# Patient Record
Sex: MALE | Race: WHITE | NOT HISPANIC OR LATINO | Employment: OTHER | URBAN - NONMETROPOLITAN AREA
[De-identification: names, ages, dates, MRNs, and addresses within clinical notes are randomized per-mention and may not be internally consistent; named-entity substitution may affect disease eponyms.]

---

## 2021-04-08 DIAGNOSIS — Z23 ENCOUNTER FOR IMMUNIZATION: ICD-10-CM

## 2021-08-23 ENCOUNTER — HOSPITAL ENCOUNTER (OUTPATIENT)
Dept: ULTRASOUND IMAGING | Facility: HOSPITAL | Age: 71
Discharge: HOME | End: 2021-08-23
Payer: COMMERCIAL

## 2021-08-23 DIAGNOSIS — I77.811 ABDOMINAL AORTIC ECTASIA (HCC): ICD-10-CM

## 2021-08-23 PROCEDURE — 76775 US EXAM ABDO BACK WALL LIM: CPT

## 2021-09-03 ENCOUNTER — HOSPITAL ENCOUNTER (OUTPATIENT)
Dept: NON INVASIVE DIAGNOSTICS | Facility: HOSPITAL | Age: 71
Discharge: HOME | End: 2021-09-03
Payer: COMMERCIAL

## 2021-09-03 DIAGNOSIS — I71.4 ABDOMINAL AORTIC ANEURYSM WITHOUT RUPTURE (HCC): ICD-10-CM

## 2021-09-03 PROCEDURE — 93880 EXTRACRANIAL BILAT STUDY: CPT

## 2021-09-03 PROCEDURE — 93880 EXTRACRANIAL BILAT STUDY: CPT | Performed by: SURGERY

## 2023-01-06 ENCOUNTER — HOSPITAL ENCOUNTER (OUTPATIENT)
Dept: CT IMAGING | Facility: HOSPITAL | Age: 73
End: 2023-01-06

## 2023-01-06 DIAGNOSIS — I71.43 ANEURYSM OF INFRARENAL ABDOMINAL AORTA, UNSPECIFIED WHETHER RUPTURED: ICD-10-CM

## 2023-02-22 DIAGNOSIS — E11.9 TYPE 2 DIABETES MELLITUS WITHOUT COMPLICATIONS (HCC): ICD-10-CM

## 2023-02-22 DIAGNOSIS — I77.810 THORACIC AORTIC ECTASIA (HCC): ICD-10-CM

## 2023-02-22 DIAGNOSIS — I10 ESSENTIAL (PRIMARY) HYPERTENSION: ICD-10-CM

## 2023-02-22 DIAGNOSIS — E66.01 MORBID (SEVERE) OBESITY DUE TO EXCESS CALORIES (HCC): ICD-10-CM

## 2023-02-28 ENCOUNTER — TELEPHONE (OUTPATIENT)
Dept: UROLOGY | Facility: AMBULATORY SURGERY CENTER | Age: 73
End: 2023-02-28

## 2023-02-28 NOTE — TELEPHONE ENCOUNTER
Reason for call: Patient called back and I scheduled him for 3/10, after his u/s appointment on 3/6  Please review and see if this is an appropriate date/time or if the patient needs to be seen sooner  I didn't see anything sooner than that      Patient can be reached at [phone]

## 2023-02-28 NOTE — TELEPHONE ENCOUNTER
Please Triage -   New Patient- Sandip Yo    What is the reason for the patients appointment? Dr Brain Narayan office called to schedule patient for gross hematuria  Patient is going to have u/s kidney/bladder on 03/06/23  Please review and call patient directly to schedule appointment  Patient can be reached at [phone]       Imaging/Lab Results:      Do we accept the patient's insurance or is the patient Self-Pay? Provider & Plan: [employer]   Member ID#:       Has the patient had any previous urologist(s)?no        Have patient records been requested?in epic        Has the patient had any outside testing done?in epic       Does the patient have a personal history of cancer?no       Patient can be reached at :

## 2023-03-02 ENCOUNTER — OFFICE VISIT (OUTPATIENT)
Dept: UROLOGY | Facility: CLINIC | Age: 73
End: 2023-03-02

## 2023-03-02 VITALS
BODY MASS INDEX: 40.42 KG/M2 | HEIGHT: 72 IN | TEMPERATURE: 98.3 F | WEIGHT: 298.4 LBS | DIASTOLIC BLOOD PRESSURE: 78 MMHG | HEART RATE: 73 BPM | OXYGEN SATURATION: 100 % | SYSTOLIC BLOOD PRESSURE: 146 MMHG

## 2023-03-02 DIAGNOSIS — R31.0 GROSS HEMATURIA: Primary | ICD-10-CM

## 2023-03-02 LAB
SL AMB  POCT GLUCOSE, UA: NORMAL
SL AMB LEUKOCYTE ESTERASE,UA: NORMAL
SL AMB POCT BILIRUBIN,UA: NORMAL
SL AMB POCT BLOOD,UA: NORMAL
SL AMB POCT CLARITY,UA: CLEAR
SL AMB POCT COLOR,UA: YELLOW
SL AMB POCT KETONES,UA: NORMAL
SL AMB POCT NITRITE,UA: NORMAL
SL AMB POCT PH,UA: 6
SL AMB POCT SPECIFIC GRAVITY,UA: 1.01
SL AMB POCT URINE PROTEIN: NORMAL
SL AMB POCT UROBILINOGEN: 0.2

## 2023-03-02 RX ORDER — PANTOPRAZOLE SODIUM 40 MG/1
TABLET, DELAYED RELEASE ORAL
COMMUNITY
End: 2023-03-13

## 2023-03-02 RX ORDER — DULAGLUTIDE 3 MG/.5ML
INJECTION, SOLUTION SUBCUTANEOUS
COMMUNITY
Start: 2023-01-04

## 2023-03-02 RX ORDER — KETOCONAZOLE 20 MG/G
CREAM TOPICAL
COMMUNITY
End: 2023-03-13

## 2023-03-02 RX ORDER — ATORVASTATIN CALCIUM 40 MG/1
TABLET, FILM COATED ORAL
COMMUNITY
Start: 2023-02-04

## 2023-03-02 RX ORDER — ASPIRIN 81 MG/1
81 TABLET ORAL DAILY
COMMUNITY

## 2023-03-02 RX ORDER — CEPHALEXIN 500 MG/1
CAPSULE ORAL
COMMUNITY
End: 2023-03-13

## 2023-03-02 RX ORDER — INSULIN ADMIN. SUPPLIES
INSULIN PEN (EA) SUBCUTANEOUS
COMMUNITY

## 2023-03-02 RX ORDER — NIRMATRELVIR AND RITONAVIR 300-100 MG
KIT ORAL
COMMUNITY
Start: 2023-01-08 | End: 2023-03-13

## 2023-03-02 RX ORDER — METOPROLOL SUCCINATE 25 MG/1
TABLET, EXTENDED RELEASE ORAL
COMMUNITY
Start: 2023-02-23

## 2023-03-02 RX ORDER — GLIPIZIDE 10 MG/1
TABLET, FILM COATED, EXTENDED RELEASE ORAL
COMMUNITY
Start: 2023-02-21

## 2023-03-02 RX ORDER — HYDROCHLOROTHIAZIDE 25 MG/1
TABLET ORAL
COMMUNITY
Start: 2023-01-15

## 2023-03-02 RX ORDER — AMLODIPINE BESYLATE 5 MG/1
TABLET ORAL DAILY
COMMUNITY
Start: 2023-01-18

## 2023-03-02 RX ORDER — CANDESARTAN 32 MG/1
TABLET ORAL
COMMUNITY
Start: 2023-02-23

## 2023-03-02 RX ORDER — OXYCODONE HYDROCHLORIDE 5 MG/1
TABLET ORAL
COMMUNITY

## 2023-03-02 NOTE — PROGRESS NOTES
UROLOGY PROGRESS NOTE         NAME: Guevara Young  AGE: 67 y o  SEX: male  : 1950   MRN: [id number]    DATE: 3/2/2023  TIME: 2:49 PM    Assessment and Plan      Impression:   1  Gross hematuria  -     POCT urine dip auto non-scope    Patient had a recent noncontrast CAT scan did not reveal any significant  abnormalities  Work-up for gross hematuria discussed with patient  He is agreeable to the work-up     Plan: Cystoscopy in the near future he should follow through with a renal ultrasound  We will send urine for cytology today we will get copies of his PSA and other blood work that was done  Risk of malignancy discussed  Chief Complaint     Chief Complaint   Patient presents with   • Gross Hematuria     Passing blood clots in urine on 23  No clots today  Saw pcp on Monday, and was referred to urology  Scheduled for US on 23 at [facility]  History of Present Illness     HPI: Bal Terrazas is a 67y o  year old male who presents with history of gross hematuria  This was noted to be in the toilet  He passed a small clot  His wife pointed out the blood in the toilet  No gross hematuria since then  This was a recent occurrence  He has no significant voiding dysfunction he does wake up at night to void  He generally has a good flow  He occasionally has some urgency  He feels that he empties  He has been on a significant diet the past year and has lost 50 pounds  No  history  He does have a history of normal PSAs and from the past   No stone history  No more hematuria since the original episode  He continues to have microscopic blood  1+ blood on today's visit    Otherwise normal UA              The following portions of the patient's history were reviewed and updated as appropriate: allergies, current medications, past family history, past medical history, past social history, past surgical history and problem list   Past Medical History:   Diagnosis Date   • Diabetes (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   • Wound of left leg      Past Surgical History:   Procedure Laterality Date   • COLONOSCOPY  2022   • HIATAL HERNIA REPAIR N/A    • WISDOM TOOTH EXTRACTION N/A     2 upper wisdom teeth     shoulder  Review of Systems     Const: Denies chills, fever and weight loss  CV: Denies chest pain  Resp: Denies SOB  GI: Denies abdominal pain, nausea and vomiting  : Denies symptoms other than stated above  Musculo: Denies back pain  Objective   /78 (BP Location: Left arm, Patient Position: Sitting)   Pulse 73   Temp 98 3 °F (36 8 °C)   Ht 6' (1 829 m)   Wt 135 kg (298 lb 6 4 oz)   SpO2 100%   BMI 40 47 kg/m²     Physical Exam  Const: Appears healthy and well developed  No signs of acute distress present  Resp: Respirations are regular and unlabored  CV: Rate is regular  Rhythm is regular  Abdomen: Abdomen is soft, nontender, and nondistended  Kidneys are not palpable  : No CVA tenderness  No suprapubic tenderness  No hernias  Penis testicles epididymis scrotum foreskin all normal   Prostate 1+ benign  Psych: Patient's attitude is cooperative   Mood is normal  Affect is normal     Current Medications     Current Outpatient Medications:   •  amLODIPine (NORVASC) 5 mg tablet, , Disp: , Rfl:   •  aspirin (ECOTRIN LOW STRENGTH) 81 mg EC tablet, Take 81 mg by mouth daily, Disp: , Rfl:   •  atorvastatin (LIPITOR) 40 mg tablet, , Disp: , Rfl:   •  candesartan (ATACAND) 32 MG tablet, , Disp: , Rfl:   •  glipiZIDE (GLUCOTROL XL) 10 mg 24 hr tablet, , Disp: , Rfl:   •  metFORMIN (GLUCOPHAGE) 1000 MG tablet, , Disp: , Rfl:   •  metoprolol succinate (TOPROL-XL) 25 mg 24 hr tablet, , Disp: , Rfl:   •  Trulicity 3 LT/3 5TB injection, , Disp: , Rfl:   •  cephalexin (KEFLEX) 500 mg capsule, Take by mouth (Patient not taking: Reported on 3/2/2023), Disp: , Rfl:   •  Empagliflozin 25 MG TABS, Take by mouth (Patient not taking: Reported on 3/2/2023), Disp: , Rfl:   •  hydrochlorothiazide (HYDRODIURIL) 25 mg tablet, , Disp: , Rfl:   •  Insulin Pen Needle (NovoFine Autocover Pen Needle) 30G X 8 MM MISC, , Disp: , Rfl:   •  ketoconazole (NIZORAL) 2 % cream, , Disp: , Rfl:   •  oxyCODONE (ROXICODONE) 5 immediate release tablet, , Disp: , Rfl:   •  pantoprazole (PROTONIX) 40 mg tablet, Take by mouth (Patient not taking: Reported on 3/2/2023), Disp: , Rfl:   •  Paxlovid, 300/100, tablet therapy pack, take 2 NIRMATRELVIR tablets with 1 RITONAVIR tablet twice a day for 5 days (Patient not taking: Reported on 3/2/2023), Disp: , Rfl:         Yarely Benton MD

## 2023-03-06 ENCOUNTER — HOSPITAL ENCOUNTER (OUTPATIENT)
Dept: ULTRASOUND IMAGING | Facility: HOSPITAL | Age: 73
Discharge: HOME | End: 2023-03-06

## 2023-03-06 DIAGNOSIS — R31.9 HEMATURIA, UNSPECIFIED: ICD-10-CM

## 2023-03-10 ENCOUNTER — TELEPHONE (OUTPATIENT)
Dept: UROLOGY | Facility: CLINIC | Age: 73
End: 2023-03-10

## 2023-03-10 ENCOUNTER — PREP FOR PROCEDURE (OUTPATIENT)
Dept: UROLOGY | Facility: CLINIC | Age: 73
End: 2023-03-10

## 2023-03-10 ENCOUNTER — PROCEDURE VISIT (OUTPATIENT)
Dept: UROLOGY | Facility: CLINIC | Age: 73
End: 2023-03-10

## 2023-03-10 VITALS
BODY MASS INDEX: 39.77 KG/M2 | TEMPERATURE: 97.1 F | SYSTOLIC BLOOD PRESSURE: 138 MMHG | HEIGHT: 72 IN | HEART RATE: 86 BPM | OXYGEN SATURATION: 92 % | DIASTOLIC BLOOD PRESSURE: 70 MMHG | WEIGHT: 293.6 LBS

## 2023-03-10 DIAGNOSIS — Z01.810 PRE-OPERATIVE CARDIOVASCULAR EXAMINATION: ICD-10-CM

## 2023-03-10 DIAGNOSIS — D49.4 BLADDER TUMOR: Primary | ICD-10-CM

## 2023-03-10 DIAGNOSIS — R39.89 SUSPECTED UTI: Primary | ICD-10-CM

## 2023-03-10 LAB
SL AMB  POCT GLUCOSE, UA: ABNORMAL
SL AMB LEUKOCYTE ESTERASE,UA: ABNORMAL
SL AMB POCT BILIRUBIN,UA: ABNORMAL
SL AMB POCT BLOOD,UA: ABNORMAL
SL AMB POCT CLARITY,UA: ABNORMAL
SL AMB POCT COLOR,UA: ABNORMAL
SL AMB POCT KETONES,UA: ABNORMAL
SL AMB POCT NITRITE,UA: ABNORMAL
SL AMB POCT PH,UA: 5
SL AMB POCT SPECIFIC GRAVITY,UA: 1.02
SL AMB POCT URINE PROTEIN: ABNORMAL
SL AMB POCT UROBILINOGEN: 1

## 2023-03-10 RX ORDER — CEFUROXIME AXETIL 500 MG/1
500 TABLET ORAL EVERY 12 HOURS SCHEDULED
Qty: 14 TABLET | Refills: 0 | Status: SHIPPED | OUTPATIENT
Start: 2023-03-10 | End: 2023-03-17

## 2023-03-10 NOTE — H&P (VIEW-ONLY)
UROLOGY PROGRESS NOTE         NAME: Guevara Young  AGE: 67 y o  SEX: male  : 1950   MRN: [id number]    DATE: 3/10/2023  TIME: 9:59 AM    Assessment and Plan      Impression:   1  Bladder tumor  -     cefuroxime (CEFTIN) 500 mg tablet; Take 1 tablet (500 mg total) by mouth every 12 (twelve) hours for 14 doses    Plan on TURBT next week  Informed consent obtained  Bilateral retrograde pyelograms     Plan: Patient has a small tumor in the right side of the bladder  Clot attached  We will plan on TURBT next week with bilateral retrograde pyelograms  Risk of malignancy discussed with patient  Chief Complaint     Chief Complaint   Patient presents with   • Cystoscopy   • Hematuria     History of Present Illness     HPI: Edyta Delvalle is a 67y o  year old male who presents with history hematuria for cystoscopy  CT scan and renal ultrasound reveal cystic structures consistent with benign cyst   Urine cytology negative  Patient continues to pass small clots  Cystoscopy today reveals a small bladder tumor  Patient was able to visualize the findings  The following portions of the patient's history were reviewed and updated as appropriate: allergies, current medications, past family history, past medical history, past social history, past surgical history and problem list   Past Medical History:   Diagnosis Date   • Diabetes ([facility] [address] )    • Hypertension    • 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   • Wound of left leg      Past Surgical History:   Procedure Laterality Date   • COLONOSCOPY     • HIATAL HERNIA REPAIR N/A    • WISDOM TOOTH EXTRACTION N/A     2 upper wisdom teeth     shoulder  Review of Systems     Const: Denies chills, fever and weight loss  CV: Denies chest pain  Resp: Denies SOB  GI: Denies abdominal pain, nausea and vomiting  : Denies symptoms other than stated above  Musculo: Denies back pain      Objective   /70 (BP Location: Left arm, Patient Position: Sitting, Cuff Size: Adult)   Pulse 86   Temp (!) 97 1 °F (36 2 °C) (Temporal)   Ht 6' (1 829 m)   Wt 133 kg (293 lb 9 6 oz)   SpO2 92%   BMI 39 82 kg/m²     Physical Exam  Const: Appears healthy and well developed  No signs of acute distress present  Resp: Respirations are regular and unlabored  CV: Rate is regular  Rhythm is regular  Abdomen: Abdomen is soft, nontender, and nondistended  Kidneys are not palpable  : Normal penis   Cystoscopy     Date/Time 3/10/2023 10:01 AM     Performed by  Mindi Rea MD     Authorized by Mindi Rea MD      Universal Protocol:  Consent: Verbal consent obtained  Risks and benefits: risks, benefits and alternatives were discussed  Consent given by: patient  Time out: Immediately prior to procedure a "time out" was called to verify the correct patient, procedure, equipment, support staff and site/side marked as required  Timeout called at: 3/10/2023 10:02 AM   Patient understanding: patient states understanding of the procedure being performed        Procedure Details:  Procedure type: cystoscopy    Patient tolerance: Patient tolerated the procedure well with no immediate complications    Additional Procedure Details: Patient was noted to have a normal urethra  Mildly obstructing prostate gland  There was a clot attached to a small tumor in the right base of the bladder  No other significant abnormalities  The patient was prepped with Betadine and lidocaine jelly and he will take an antibiotic when he gets home later today  The patient was able to visualize these findings  No complications the patient voided adequately at the end of the procedure  He did pass a small clot      Psych: Patient's attitude is cooperative   Mood is normal  Affect is normal     Current Medications     Current Outpatient Medications:   •  amLODIPine (NORVASC) 5 mg tablet, , Disp: , Rfl:   •  atorvastatin (LIPITOR) 40 mg tablet, , Disp: , Rfl:   •  candesartan (ATACAND) 32 MG tablet, , Disp: , Rfl:   •  cefuroxime (CEFTIN) 500 mg tablet, Take 1 tablet (500 mg total) by mouth every 12 (twelve) hours for 14 doses, Disp: 14 tablet, Rfl: 0  •  glipiZIDE (GLUCOTROL XL) 10 mg 24 hr tablet, , Disp: , Rfl:   •  hydrochlorothiazide (HYDRODIURIL) 25 mg tablet, , Disp: , Rfl:   •  metFORMIN (GLUCOPHAGE) 1000 MG tablet, , Disp: , Rfl:   •  metoprolol succinate (TOPROL-XL) 25 mg 24 hr tablet, , Disp: , Rfl:   •  Trulicity 3 RW/0 2ZU injection, , Disp: , Rfl:   •  aspirin (ECOTRIN LOW STRENGTH) 81 mg EC tablet, Take 81 mg by mouth daily (Patient not taking: Reported on 3/10/2023), Disp: , Rfl:   •  cephalexin (KEFLEX) 500 mg capsule, Take by mouth (Patient not taking: Reported on 3/2/2023), Disp: , Rfl:   •  Empagliflozin 25 MG TABS, Take by mouth (Patient not taking: Reported on 3/2/2023), Disp: , Rfl:   •  Insulin Pen Needle (NovoFine Autocover Pen Needle) 30G X 8 MM MISC, , Disp: , Rfl:   •  ketoconazole (NIZORAL) 2 % cream, , Disp: , Rfl:   •  oxyCODONE (ROXICODONE) 5 immediate release tablet, , Disp: , Rfl:   •  pantoprazole (PROTONIX) 40 mg tablet, Take by mouth (Patient not taking: Reported on 3/2/2023), Disp: , Rfl:   •  Paxlovid, 300/100, tablet therapy pack, take 2 NIRMATRELVIR tablets with 1 RITONAVIR tablet twice a day for 5 days (Patient not taking: Reported on 3/2/2023), Disp: , Rfl:         Berl Leventhal, MD

## 2023-03-10 NOTE — PROGRESS NOTES
UROLOGY PROGRESS NOTE         NAME: Guevara Young  AGE: 67 y o  SEX: male  : 1950   MRN: [id number]    DATE: 3/10/2023  TIME: 9:59 AM    Assessment and Plan      Impression:   1  Bladder tumor  -     cefuroxime (CEFTIN) 500 mg tablet; Take 1 tablet (500 mg total) by mouth every 12 (twelve) hours for 14 doses    Plan on TURBT next week  Informed consent obtained  Bilateral retrograde pyelograms     Plan: Patient has a small tumor in the right side of the bladder  Clot attached  We will plan on TURBT next week with bilateral retrograde pyelograms  Risk of malignancy discussed with patient  Chief Complaint     Chief Complaint   Patient presents with   • Cystoscopy   • Hematuria     History of Present Illness     HPI: Rosette Alcantar is a 67y o  year old male who presents with history hematuria for cystoscopy  CT scan and renal ultrasound reveal cystic structures consistent with benign cyst   Urine cytology negative  Patient continues to pass small clots  Cystoscopy today reveals a small bladder tumor  Patient was able to visualize the findings  The following portions of the patient's history were reviewed and updated as appropriate: allergies, current medications, past family history, past medical history, past social history, past surgical history and problem list   Past Medical History:   Diagnosis Date   • Diabetes ([facility] [address] )    • Hypertension    • 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   • Wound of left leg      Past Surgical History:   Procedure Laterality Date   • COLONOSCOPY     • HIATAL HERNIA REPAIR N/A    • WISDOM TOOTH EXTRACTION N/A     2 upper wisdom teeth     shoulder  Review of Systems     Const: Denies chills, fever and weight loss  CV: Denies chest pain  Resp: Denies SOB  GI: Denies abdominal pain, nausea and vomiting  : Denies symptoms other than stated above  Musculo: Denies back pain      Objective   /70 (BP Location: Left arm, Patient Position: Sitting, Cuff Size: Adult)   Pulse 86   Temp (!) 97 1 °F (36 2 °C) (Temporal)   Ht 6' (1 829 m)   Wt 133 kg (293 lb 9 6 oz)   SpO2 92%   BMI 39 82 kg/m²     Physical Exam  Const: Appears healthy and well developed  No signs of acute distress present  Resp: Respirations are regular and unlabored  CV: Rate is regular  Rhythm is regular  Abdomen: Abdomen is soft, nontender, and nondistended  Kidneys are not palpable  : Normal penis   Cystoscopy     Date/Time 3/10/2023 10:01 AM     Performed by  Silver Bourne MD     Authorized by Silver Bourne MD      Universal Protocol:  Consent: Verbal consent obtained  Risks and benefits: risks, benefits and alternatives were discussed  Consent given by: patient  Time out: Immediately prior to procedure a "time out" was called to verify the correct patient, procedure, equipment, support staff and site/side marked as required  Timeout called at: 3/10/2023 10:02 AM   Patient understanding: patient states understanding of the procedure being performed        Procedure Details:  Procedure type: cystoscopy    Patient tolerance: Patient tolerated the procedure well with no immediate complications    Additional Procedure Details: Patient was noted to have a normal urethra  Mildly obstructing prostate gland  There was a clot attached to a small tumor in the right base of the bladder  No other significant abnormalities  The patient was prepped with Betadine and lidocaine jelly and he will take an antibiotic when he gets home later today  The patient was able to visualize these findings  No complications the patient voided adequately at the end of the procedure  He did pass a small clot      Psych: Patient's attitude is cooperative   Mood is normal  Affect is normal     Current Medications     Current Outpatient Medications:   •  amLODIPine (NORVASC) 5 mg tablet, , Disp: , Rfl:   •  atorvastatin (LIPITOR) 40 mg tablet, , Disp: , Rfl:   •  candesartan (ATACAND) 32 MG tablet, , Disp: , Rfl:   •  cefuroxime (CEFTIN) 500 mg tablet, Take 1 tablet (500 mg total) by mouth every 12 (twelve) hours for 14 doses, Disp: 14 tablet, Rfl: 0  •  glipiZIDE (GLUCOTROL XL) 10 mg 24 hr tablet, , Disp: , Rfl:   •  hydrochlorothiazide (HYDRODIURIL) 25 mg tablet, , Disp: , Rfl:   •  metFORMIN (GLUCOPHAGE) 1000 MG tablet, , Disp: , Rfl:   •  metoprolol succinate (TOPROL-XL) 25 mg 24 hr tablet, , Disp: , Rfl:   •  Trulicity 3 JK/0 6YE injection, , Disp: , Rfl:   •  aspirin (ECOTRIN LOW STRENGTH) 81 mg EC tablet, Take 81 mg by mouth daily (Patient not taking: Reported on 3/10/2023), Disp: , Rfl:   •  cephalexin (KEFLEX) 500 mg capsule, Take by mouth (Patient not taking: Reported on 3/2/2023), Disp: , Rfl:   •  Empagliflozin 25 MG TABS, Take by mouth (Patient not taking: Reported on 3/2/2023), Disp: , Rfl:   •  Insulin Pen Needle (NovoFine Autocover Pen Needle) 30G X 8 MM MISC, , Disp: , Rfl:   •  ketoconazole (NIZORAL) 2 % cream, , Disp: , Rfl:   •  oxyCODONE (ROXICODONE) 5 immediate release tablet, , Disp: , Rfl:   •  pantoprazole (PROTONIX) 40 mg tablet, Take by mouth (Patient not taking: Reported on 3/2/2023), Disp: , Rfl:   •  Paxlovid, 300/100, tablet therapy pack, take 2 NIRMATRELVIR tablets with 1 RITONAVIR tablet twice a day for 5 days (Patient not taking: Reported on 3/2/2023), Disp: , Rfl:         Adrián Chowdhury MD

## 2023-03-10 NOTE — TELEPHONE ENCOUNTER
Gave verbal instructions to pt and notified I also emailed them  Advised pt he will need EKG and urine cx  Pt stated he had EKG at [facility] last week  Pt then expressed his frustration of [facility] and [facility] not having access to each others records  Explained he will need to sign for that to be allowed because we only have access to two other facilities  Pt will get urine cx on Monday 3/12   I will contact Jamie Elias for EKG results

## 2023-03-13 ENCOUNTER — TELEPHONE (OUTPATIENT)
Dept: OTHER | Facility: OTHER | Age: 73
End: 2023-03-13

## 2023-03-13 ENCOUNTER — ANESTHESIA EVENT (OUTPATIENT)
Dept: PERIOP | Facility: HOSPITAL | Age: 73
End: 2023-03-13

## 2023-03-13 ENCOUNTER — NURSE TRIAGE (OUTPATIENT)
Dept: OTHER | Facility: OTHER | Age: 73
End: 2023-03-13

## 2023-03-13 NOTE — TELEPHONE ENCOUNTER
Melinda with [employer] called in stating pt was quite upset taht his order wasn't faxed over today  He's requesting all of his labs be faxed in to this lab at [phone]

## 2023-03-13 NOTE — TELEPHONE ENCOUNTER
Regarding: Lab order to be faxed  ----- Message from Abraham Reed sent at 3/13/2023  9:09 AM EDT -----  "This pt is here for a urine culture but the order wasn't faxed to us   Please fax it to [phone] "

## 2023-03-13 NOTE — PRE-PROCEDURE INSTRUCTIONS
Pre-Surgery Instructions:   Medication Instructions   • amLODIPine (NORVASC) 5 mg tablet Take day of surgery  • aspirin (ECOTRIN LOW STRENGTH) 81 mg EC tablet Stop taking 7 days prior to surgery  • atorvastatin (LIPITOR) 40 mg tablet Take day of surgery  • candesartan (ATACAND) 32 MG tablet Hold day of surgery  • cefuroxime (CEFTIN) 500 mg tablet Instructions provided by MD   • glipiZIDE (GLUCOTROL XL) 10 mg 24 hr tablet Hold day of surgery  • hydrochlorothiazide (HYDRODIURIL) 25 mg tablet Hold day of surgery  • metFORMIN (GLUCOPHAGE) 1000 MG tablet Hold day of surgery  • metoprolol succinate (TOPROL-XL) 25 mg 24 hr tablet Take day of surgery  • Trulicity 3 XT/7 1LU injection N/A not due on DOS    Medication instructions for day surgery reviewed  Please use only a sip of water to take your instructed medications  Avoid all over the counter vitamins, supplements and NSAIDS for one week prior to surgery per anesthesia guidelines  Tylenol is ok to take as needed  You will receive a call one business day prior to surgery with an arrival time and hospital directions  If your surgery is scheduled on a Monday, the hospital will be calling you on the Friday prior to your surgery  If you have not heard from anyone by 8pm, please call the hospital supervisor through the hospital  at [phone]  [facility] [phone])  Do not eat or drink anything after midnight the night before your surgery, including candy, mints, lifesavers, or chewing gum  Do not drink alcohol 24hrs before your surgery  Try not to smoke at least 24hrs before your surgery  Follow the pre surgery showering instructions as listed in the [facility] Surgical Experience Booklet” or otherwise provided by your surgeon's office  Do not shave the surgical area 24 hours before surgery  Do not apply any lotions, creams, including makeup, cologne, deodorant, or perfumes after showering on the day of your surgery       No contact lenses, eye make-up, or artificial eyelashes  Remove nail polish, including gel polish, and any artificial, gel, or acrylic nails if possible  Remove all jewelry including rings and body piercing jewelry  Wear causal clothing that is easy to take on and off  Consider your type of surgery  Keep any valuables, jewelry, piercings at home  Please bring any specially ordered equipment (sling, braces) if indicated  Arrange for a responsible person to drive you to and from the hospital on the day of your surgery  Visitor Guidelines discussed  Call the surgeon's office with any new illnesses, exposures, or additional questions prior to surgery  Please reference your [facility] Surgical Experience Booklet” for additional information to prepare for your upcoming surgery

## 2023-03-13 NOTE — TELEPHONE ENCOUNTER
Reason for Disposition  • Information only question and nurse able to answer    Protocols used: INFORMATION ONLY CALL - NO TRIAGE-ADULT-[place]

## 2023-03-13 NOTE — TELEPHONE ENCOUNTER
Order faxed  Bryan confirmed fax  Answer Assessment - Initial Assessment Questions  1   REASON FOR CALL or QUESTION: "What is your reason for calling today?" or "How can I best help you?" or "What question do you have that I can help answer?"      Lab order fax    Protocols used: INFORMATION ONLY CALL - NO TRIAGE-ADULT-[place]

## 2023-03-13 NOTE — TELEPHONE ENCOUNTER
Pt called the office so upset that the order for urine was supposed to be faxed on Friday  Pt went there and there was nothing faxed  Pt left a specimen at the [facility] lab  Pt requested a call back from a provider  Please advise

## 2023-03-14 NOTE — ANESTHESIA PREPROCEDURE EVALUATION
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 2019   Physical Exam    Airway    Mallampati score: II  TM Distance: >3 FB  Neck ROM: full     Dental   No notable dental hx     Cardiovascular      Pulmonary      Other Findings        Anesthesia Plan  ASA Score- 3     Anesthesia Type- general with ASA Monitors  Additional Monitors:   Airway Plan: LMA  Plan Factors-Exercise tolerance (METS): >4 METS  Chart reviewed  EKG reviewed  Existing labs reviewed  Patient summary reviewed  Patient is not a current smoker  Obstructive sleep apnea risk education given perioperatively  Induction-     Postoperative Plan-     Informed Consent- Anesthetic plan and risks discussed with patient  I personally reviewed this patient with the CRNA  Discussed and agreed on the Anesthesia Plan with the CRNA  Herlinda Hope

## 2023-03-15 ENCOUNTER — ANESTHESIA (OUTPATIENT)
Dept: PERIOP | Facility: HOSPITAL | Age: 73
End: 2023-03-15

## 2023-03-15 ENCOUNTER — HOSPITAL ENCOUNTER (OUTPATIENT)
Facility: HOSPITAL | Age: 73
Setting detail: OUTPATIENT SURGERY
Discharge: HOME | End: 2023-03-15
Attending: UROLOGY | Admitting: UROLOGY

## 2023-03-15 ENCOUNTER — APPOINTMENT (OUTPATIENT)
Dept: RADIOLOGY | Facility: HOSPITAL | Age: 73
End: 2023-03-15

## 2023-03-15 ENCOUNTER — TELEPHONE (OUTPATIENT)
Dept: OTHER | Facility: OTHER | Age: 73
End: 2023-03-15

## 2023-03-15 VITALS
RESPIRATION RATE: 18 BRPM | OXYGEN SATURATION: 97 % | HEART RATE: 71 BPM | WEIGHT: 293 LBS | HEIGHT: 72 IN | BODY MASS INDEX: 39.68 KG/M2 | DIASTOLIC BLOOD PRESSURE: 76 MMHG | TEMPERATURE: 98.2 F | SYSTOLIC BLOOD PRESSURE: 145 MMHG

## 2023-03-15 DIAGNOSIS — D49.4 BLADDER TUMOR: ICD-10-CM

## 2023-03-15 LAB
ANION GAP SERPL CALCULATED.3IONS-SCNC: 7 MMOL/L (ref 4–13)
BUN SERPL-MCNC: 27 MG/DL (ref 5–25)
CALCIUM SERPL-MCNC: 9.4 MG/DL (ref 8.4–10.2)
CHLORIDE SERPL-SCNC: 105 MMOL/L (ref 96–108)
CO2 SERPL-SCNC: 26 MMOL/L (ref 21–32)
CREAT SERPL-MCNC: 0.98 MG/DL (ref 0.6–1.3)
GFR SERPL CREATININE-BSD FRML MDRD: 76 ML/MIN/1.73SQ M
GLUCOSE P FAST SERPL-MCNC: 107 MG/DL (ref 65–99)
GLUCOSE SERPL-MCNC: 101 MG/DL (ref 65–140)
GLUCOSE SERPL-MCNC: 107 MG/DL (ref 65–140)
POTASSIUM SERPL-SCNC: 4.1 MMOL/L (ref 3.5–5.3)
SODIUM SERPL-SCNC: 138 MMOL/L (ref 135–147)

## 2023-03-15 RX ORDER — FENTANYL CITRATE/PF 50 MCG/ML
25 SYRINGE (ML) INJECTION
Status: DISCONTINUED | OUTPATIENT
Start: 2023-03-15 | End: 2023-03-15 | Stop reason: HOSPADM

## 2023-03-15 RX ORDER — CEFAZOLIN SODIUM 1 G/3ML
INJECTION, POWDER, FOR SOLUTION INTRAMUSCULAR; INTRAVENOUS AS NEEDED
Status: DISCONTINUED | OUTPATIENT
Start: 2023-03-15 | End: 2023-03-15

## 2023-03-15 RX ORDER — HYDROMORPHONE HCL/PF 1 MG/ML
0.5 SYRINGE (ML) INJECTION
Status: DISCONTINUED | OUTPATIENT
Start: 2023-03-15 | End: 2023-03-15 | Stop reason: HOSPADM

## 2023-03-15 RX ORDER — PROPOFOL 10 MG/ML
INJECTION, EMULSION INTRAVENOUS AS NEEDED
Status: DISCONTINUED | OUTPATIENT
Start: 2023-03-15 | End: 2023-03-15

## 2023-03-15 RX ORDER — ONDANSETRON 2 MG/ML
INJECTION INTRAMUSCULAR; INTRAVENOUS AS NEEDED
Status: DISCONTINUED | OUTPATIENT
Start: 2023-03-15 | End: 2023-03-15

## 2023-03-15 RX ORDER — PHENYLEPHRINE HCL IN 0.9% NACL 1 MG/10 ML
SYRINGE (ML) INTRAVENOUS AS NEEDED
Status: DISCONTINUED | OUTPATIENT
Start: 2023-03-15 | End: 2023-03-15

## 2023-03-15 RX ORDER — MAGNESIUM HYDROXIDE 1200 MG/15ML
LIQUID ORAL AS NEEDED
Status: DISCONTINUED | OUTPATIENT
Start: 2023-03-15 | End: 2023-03-15 | Stop reason: HOSPADM

## 2023-03-15 RX ORDER — DEXAMETHASONE SODIUM PHOSPHATE 10 MG/ML
INJECTION, SOLUTION INTRAMUSCULAR; INTRAVENOUS AS NEEDED
Status: DISCONTINUED | OUTPATIENT
Start: 2023-03-15 | End: 2023-03-15

## 2023-03-15 RX ORDER — FENTANYL CITRATE 50 UG/ML
INJECTION, SOLUTION INTRAMUSCULAR; INTRAVENOUS AS NEEDED
Status: DISCONTINUED | OUTPATIENT
Start: 2023-03-15 | End: 2023-03-15

## 2023-03-15 RX ORDER — LIDOCAINE HYDROCHLORIDE 10 MG/ML
INJECTION, SOLUTION EPIDURAL; INFILTRATION; INTRACAUDAL; PERINEURAL AS NEEDED
Status: DISCONTINUED | OUTPATIENT
Start: 2023-03-15 | End: 2023-03-15

## 2023-03-15 RX ORDER — ALBUTEROL SULFATE 2.5 MG/3ML
2.5 SOLUTION RESPIRATORY (INHALATION) ONCE AS NEEDED
Status: DISCONTINUED | OUTPATIENT
Start: 2023-03-15 | End: 2023-03-15 | Stop reason: HOSPADM

## 2023-03-15 RX ORDER — SODIUM CHLORIDE, SODIUM LACTATE, POTASSIUM CHLORIDE, CALCIUM CHLORIDE 600; 310; 30; 20 MG/100ML; MG/100ML; MG/100ML; MG/100ML
INJECTION, SOLUTION INTRAVENOUS CONTINUOUS PRN
Status: DISCONTINUED | OUTPATIENT
Start: 2023-03-15 | End: 2023-03-15

## 2023-03-15 RX ORDER — ONDANSETRON 2 MG/ML
4 INJECTION INTRAMUSCULAR; INTRAVENOUS ONCE AS NEEDED
Status: DISCONTINUED | OUTPATIENT
Start: 2023-03-15 | End: 2023-03-15 | Stop reason: HOSPADM

## 2023-03-15 RX ORDER — CEFAZOLIN SODIUM 2 G/50ML
2000 SOLUTION INTRAVENOUS ONCE
Status: COMPLETED | OUTPATIENT
Start: 2023-03-15 | End: 2023-03-15

## 2023-03-15 RX ORDER — PHENAZOPYRIDINE HYDROCHLORIDE 100 MG/1
200 TABLET, FILM COATED ORAL
Status: COMPLETED | OUTPATIENT
Start: 2023-03-15 | End: 2023-03-15

## 2023-03-15 RX ADMIN — DEXAMETHASONE SODIUM PHOSPHATE 5 MG: 10 INJECTION, SOLUTION INTRAMUSCULAR; INTRAVENOUS at 08:25

## 2023-03-15 RX ADMIN — CEFAZOLIN SODIUM 2000 MG: 2 SOLUTION INTRAVENOUS at 08:20

## 2023-03-15 RX ADMIN — FENTANYL CITRATE 50 MCG: 50 INJECTION INTRAMUSCULAR; INTRAVENOUS at 08:36

## 2023-03-15 RX ADMIN — CEFAZOLIN 1000 MG: 1 INJECTION, POWDER, FOR SOLUTION INTRAMUSCULAR; INTRAVENOUS at 08:25

## 2023-03-15 RX ADMIN — SODIUM CHLORIDE, SODIUM LACTATE, POTASSIUM CHLORIDE, AND CALCIUM CHLORIDE: .6; .31; .03; .02 INJECTION, SOLUTION INTRAVENOUS at 08:18

## 2023-03-15 RX ADMIN — LIDOCAINE HYDROCHLORIDE 50 MG: 10 INJECTION, SOLUTION EPIDURAL; INFILTRATION; INTRACAUDAL; PERINEURAL at 08:22

## 2023-03-15 RX ADMIN — PHENAZOPYRIDINE 200 MG: 100 TABLET ORAL at 09:37

## 2023-03-15 RX ADMIN — Medication 100 MCG: at 08:40

## 2023-03-15 RX ADMIN — Medication 100 MCG: at 09:00

## 2023-03-15 RX ADMIN — Medication 100 MCG: at 08:22

## 2023-03-15 RX ADMIN — PROPOFOL 200 MG: 10 INJECTION, EMULSION INTRAVENOUS at 08:22

## 2023-03-15 RX ADMIN — ONDANSETRON 4 MG: 2 INJECTION INTRAMUSCULAR; INTRAVENOUS at 08:54

## 2023-03-15 NOTE — OP NOTE
OPERATIVE REPORT  PATIENT NAME: Otis Fink  :  1950  MRN: [id number]  Pt Location: OW OR ROOM 01    SURGERY DATE: 3/15/2023    Surgeon(s) and Role:     * Luciano Rahman MD - Primary    Preop Diagnosis:  Bladder tumor [D49 4]    Post-Op Diagnosis Codes: * Bladder tumor [D49 4]    Procedure(s):  Bilateral - TRANSURETHRAL RESECTION OF BLADDER TUMOR (TURBT) with bilateral retrograde pyelograms    Specimen(s):  ID Type Source Tests Collected by Time Destination   1 : erythematous plaque bladder base Tissue Urinary Bladder TISSUE EXAM Luciano Rahman MD 3/15/2023  8:54 AM        Estimated Blood Loss:   Minimal    Drains:  * No LDAs found *    Anesthesia Type:   General    Operative Indications:  Bladder tumor [D49 4]      Operative Findings:  Erythematous plaque base of bladder  Normal retrograde pyelograms  BPH  No visible tumors    Complications:   None    Procedure and Technique:  Patient brought the operating room  He was identified  He received his perioperative antibiotic  He had SCDs in place  He received an adequate anesthetic  Informed consent was obtained  In the dorsolithotomy position he was prepped and draped  Resectoscope sheath was placed without difficulty under direct vision  The urethra was normal   The prostate gland was enlarged  The patient had a median bar  There were no visible tumors in the bladder and there was an erythematous plaque at the base of the bladder  There was an eschar in the middle of it  The orifices were normal in the E flux clear urine bilaterally  The bladder was mildly trabeculated  Bilateral retrograde pyelograms were then obtained using a tiger tail catheter with diluted contrast 50-50   7 cc was injected in the left ureteral orifice  Fluoroscopy revealed a normal ureter and normal collecting system and renal pelvis of the left kidney  The system drained nicely as well  There were no filling defects obstruction or stones noted  Similar procedure was done on the right side with a similar amount of contrast   There were no abnormalities of the ureter renal pelvis or calyces or collecting system on the right side either  It filled and emptied normally  Subsequently cold cup biopsy forceps with water irrigation was used to biopsy the erythematous plaque on the base of the bladder  The biopsy specimen was adequate  A Bugbee electrode was used to cauterize the entire lesion  Lesion was roughly 1 5 cm in size  Patient subsequently was brought to the recovery in stable condition  There were no complications procedure  Bleeding was minimal   Follow-up arrangements prescriptions instructions were discussed  The patient had a planned discharge     I was present for the entire procedure    Patient Disposition:  PACU         SIGNATURE: Yarely Benton MD  DATE: March 15, 2023  TIME: 9:21 AM

## 2023-03-15 NOTE — INTERVAL H&P NOTE
H&P reviewed  After examining the patient I find no changes in the patients condition since the H&P had been written      Vitals:    03/15/23 0656   BP: 154/75   Pulse: 79   Resp: 20   Temp: 98 2 °F (36 8 °C)   SpO2: 97%

## 2023-03-15 NOTE — TELEPHONE ENCOUNTER
Patient had a procedure today with Dr Colletta Qua and was told to call and schedule a follow up appointment in one week

## 2023-03-15 NOTE — DISCHARGE INSTR - AVS FIRST PAGE
It is normal to see some blood in the urine  It is normal to have to void more often  Avoid vigorous activity for 2 weeks  Call the office to be seen in 1 week to go over the results

## 2023-03-15 NOTE — ANESTHESIA POSTPROCEDURE EVALUATION
Post-Op Assessment Note    CV Status:  Stable  Pain Score: 0    Pain management: adequate     Mental Status:  Alert and awake   Hydration Status:  Stable   PONV Controlled:  Controlled   Airway Patency:  Patent      Post Op Vitals Reviewed: Yes      Staff: CRNA         No notable events documented      BP   129/71   Temp   97 7   Pulse  75   Resp   16   SpO2   97

## 2023-03-16 ENCOUNTER — TELEPHONE (OUTPATIENT)
Dept: UROLOGY | Facility: CLINIC | Age: 73
End: 2023-03-16

## 2023-03-16 LAB — BACTERIA UR CULT: NORMAL

## 2023-03-16 NOTE — TELEPHONE ENCOUNTER
Message left with pt  To return call  Needs to schedule an appt  With Dr Jaqueline Jacinto in one week

## 2023-03-18 ENCOUNTER — HOSPITAL ENCOUNTER (OUTPATIENT)
Dept: NON INVASIVE DIAGNOSTICS | Facility: HOSPITAL | Age: 73
Discharge: HOME | End: 2023-03-18

## 2023-03-18 VITALS
HEIGHT: 72 IN | DIASTOLIC BLOOD PRESSURE: 70 MMHG | SYSTOLIC BLOOD PRESSURE: 134 MMHG | HEART RATE: 71 BPM | WEIGHT: 293 LBS | BODY MASS INDEX: 39.68 KG/M2

## 2023-03-18 DIAGNOSIS — I77.810 THORACIC AORTIC ECTASIA (HCC): ICD-10-CM

## 2023-03-18 DIAGNOSIS — I10 ESSENTIAL (PRIMARY) HYPERTENSION: ICD-10-CM

## 2023-03-18 DIAGNOSIS — E66.01 MORBID (SEVERE) OBESITY DUE TO EXCESS CALORIES (HCC): ICD-10-CM

## 2023-03-18 DIAGNOSIS — E11.9 TYPE 2 DIABETES MELLITUS WITHOUT COMPLICATIONS (HCC): ICD-10-CM

## 2023-03-20 LAB
AORTIC ARCH: 3.5 CM
ASCENDING AORTA: 4.1 CM
RA PRESSURE ESTIMATED: 3 MMHG
SL CV LV EF: 60

## 2023-03-22 ENCOUNTER — TELEPHONE (OUTPATIENT)
Dept: UROLOGY | Facility: CLINIC | Age: 73
End: 2023-03-22

## 2023-03-22 ENCOUNTER — OFFICE VISIT (OUTPATIENT)
Dept: UROLOGY | Facility: CLINIC | Age: 73
End: 2023-03-22

## 2023-03-22 VITALS
DIASTOLIC BLOOD PRESSURE: 80 MMHG | WEIGHT: 293 LBS | HEART RATE: 78 BPM | TEMPERATURE: 97.6 F | BODY MASS INDEX: 39.68 KG/M2 | SYSTOLIC BLOOD PRESSURE: 164 MMHG | HEIGHT: 72 IN

## 2023-03-22 DIAGNOSIS — D49.4 BLADDER TUMOR: Primary | ICD-10-CM
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
## 2023-03-27 NOTE — TELEPHONE ENCOUNTER
Pathology findings discussed with patient  No sample was present for the histology  Apparently it was a processing error  This was discussed with the patient  There is no sense in repeating the biopsy because the area has been destroyed by cautery  We will see the patient back in 3 months for another cystoscopy  If there is any abnormalities remaining  We will rebiopsy the area  Currently he is clinically doing well

## 2023-03-29 ENCOUNTER — HOSPITAL ENCOUNTER (EMERGENCY)
Facility: HOSPITAL | Age: 73
Discharge: HOME | End: 2023-03-29
Attending: EMERGENCY MEDICINE

## 2023-03-29 ENCOUNTER — APPOINTMENT (EMERGENCY)
Dept: CT IMAGING | Facility: HOSPITAL | Age: 73
End: 2023-03-29

## 2023-03-29 ENCOUNTER — NURSE TRIAGE (OUTPATIENT)
Dept: OTHER | Facility: OTHER | Age: 73
End: 2023-03-29

## 2023-03-29 ENCOUNTER — TELEPHONE (OUTPATIENT)
Dept: OTHER | Facility: HOSPITAL | Age: 73
End: 2023-03-29

## 2023-03-29 VITALS
OXYGEN SATURATION: 95 % | RESPIRATION RATE: 18 BRPM | TEMPERATURE: 97.6 F | SYSTOLIC BLOOD PRESSURE: 159 MMHG | BODY MASS INDEX: 40.31 KG/M2 | WEIGHT: 297.62 LBS | HEIGHT: 72 IN | HEART RATE: 86 BPM | DIASTOLIC BLOOD PRESSURE: 86 MMHG

## 2023-03-29 DIAGNOSIS — N30.01 ACUTE CYSTITIS WITH HEMATURIA: Primary | ICD-10-CM

## 2023-03-29 DIAGNOSIS — R31.9 HEMATURIA, UNSPECIFIED TYPE: Primary | ICD-10-CM

## 2023-03-29 LAB
ALBUMIN SERPL BCP-MCNC: 4 G/DL (ref 3.5–5)
ALP SERPL-CCNC: 86 U/L (ref 34–104)
ALT SERPL W P-5'-P-CCNC: 16 U/L (ref 7–52)
ANION GAP SERPL CALCULATED.3IONS-SCNC: 8 MMOL/L (ref 4–13)
APTT PPP: 25 SECONDS (ref 23–37)
AST SERPL W P-5'-P-CCNC: 15 U/L (ref 13–39)
BACTERIA UR QL AUTO: ABNORMAL /HPF
BACTERIA UR QL AUTO: NORMAL /HPF
BASOPHILS # BLD AUTO: 0.05 THOUSANDS/ÂΜL (ref 0–0.1)
BASOPHILS NFR BLD AUTO: 1 % (ref 0–1)
BILIRUB SERPL-MCNC: 0.69 MG/DL (ref 0.2–1)
BILIRUB UR QL STRIP: NEGATIVE
BILIRUB UR QL STRIP: NEGATIVE
BUN SERPL-MCNC: 26 MG/DL (ref 5–25)
CALCIUM SERPL-MCNC: 9 MG/DL (ref 8.4–10.2)
CHLORIDE SERPL-SCNC: 101 MMOL/L (ref 96–108)
CLARITY UR: ABNORMAL
CLARITY UR: ABNORMAL
CO2 SERPL-SCNC: 26 MMOL/L (ref 21–32)
COLOR UR: ABNORMAL
COLOR UR: ABNORMAL
CREAT SERPL-MCNC: 1.03 MG/DL (ref 0.6–1.3)
EOSINOPHIL # BLD AUTO: 0.23 THOUSAND/ÂΜL (ref 0–0.61)
EOSINOPHIL NFR BLD AUTO: 3 % (ref 0–6)
ERYTHROCYTE [DISTWIDTH] IN BLOOD BY AUTOMATED COUNT: 13.7 % (ref 11.6–15.1)
GFR SERPL CREATININE-BSD FRML MDRD: 71 ML/MIN/1.73SQ M
GLUCOSE SERPL-MCNC: 272 MG/DL (ref 65–140)
GLUCOSE UR STRIP-MCNC: ABNORMAL MG/DL
GLUCOSE UR STRIP-MCNC: ABNORMAL MG/DL
HCT VFR BLD AUTO: 41.2 % (ref 36.5–49.3)
HGB BLD-MCNC: 13.4 G/DL (ref 12–17)
HGB UR QL STRIP.AUTO: ABNORMAL
HGB UR QL STRIP.AUTO: ABNORMAL
IMM GRANULOCYTES # BLD AUTO: 0.04 THOUSAND/UL (ref 0–0.2)
IMM GRANULOCYTES NFR BLD AUTO: 1 % (ref 0–2)
INR PPP: 0.96 (ref 0.84–1.19)
KETONES UR STRIP-MCNC: ABNORMAL MG/DL
KETONES UR STRIP-MCNC: NEGATIVE MG/DL
LEUKOCYTE ESTERASE UR QL STRIP: ABNORMAL
LEUKOCYTE ESTERASE UR QL STRIP: NEGATIVE
LYMPHOCYTES # BLD AUTO: 1.58 THOUSANDS/ÂΜL (ref 0.6–4.47)
LYMPHOCYTES NFR BLD AUTO: 18 % (ref 14–44)
MCH RBC QN AUTO: 28.3 PG (ref 26.8–34.3)
MCHC RBC AUTO-ENTMCNC: 32.5 G/DL (ref 31.4–37.4)
MCV RBC AUTO: 87 FL (ref 82–98)
MONOCYTES # BLD AUTO: 0.6 THOUSAND/ÂΜL (ref 0.17–1.22)
MONOCYTES NFR BLD AUTO: 7 % (ref 4–12)
NEUTROPHILS # BLD AUTO: 6.37 THOUSANDS/ÂΜL (ref 1.85–7.62)
NEUTS SEG NFR BLD AUTO: 70 % (ref 43–75)
NITRITE UR QL STRIP: NEGATIVE
NITRITE UR QL STRIP: POSITIVE
NON-SQ EPI CELLS URNS QL MICRO: ABNORMAL /HPF
NON-SQ EPI CELLS URNS QL MICRO: NORMAL /HPF
NRBC BLD AUTO-RTO: 0 /100 WBCS
PH UR STRIP.AUTO: 5 [PH]
PH UR STRIP.AUTO: 5.5 [PH]
PLATELET # BLD AUTO: 229 THOUSANDS/UL (ref 149–390)
PMV BLD AUTO: 10.2 FL (ref 8.9–12.7)
POTASSIUM SERPL-SCNC: 3.5 MMOL/L (ref 3.5–5.3)
PROT SERPL-MCNC: 6.8 G/DL (ref 6.4–8.4)
PROT UR STRIP-MCNC: ABNORMAL MG/DL
PROT UR STRIP-MCNC: NEGATIVE MG/DL
PROTHROMBIN TIME: 12.8 SECONDS (ref 11.6–14.5)
RBC # BLD AUTO: 4.73 MILLION/UL (ref 3.88–5.62)
RBC #/AREA URNS AUTO: ABNORMAL /HPF
RBC #/AREA URNS AUTO: NORMAL /HPF
SODIUM SERPL-SCNC: 135 MMOL/L (ref 135–147)
SP GR UR STRIP.AUTO: 1.02 (ref 1–1.03)
SP GR UR STRIP.AUTO: <=1.005 (ref 1–1.03)
UROBILINOGEN UR QL STRIP.AUTO: 0.2 E.U./DL
UROBILINOGEN UR QL STRIP.AUTO: 1 E.U./DL
WBC # BLD AUTO: 8.87 THOUSAND/UL (ref 4.31–10.16)
WBC #/AREA URNS AUTO: ABNORMAL /HPF
WBC #/AREA URNS AUTO: NORMAL /HPF

## 2023-03-29 RX ORDER — CEFTRIAXONE 1 G/50ML
1000 INJECTION, SOLUTION INTRAVENOUS ONCE
Status: COMPLETED | OUTPATIENT
Start: 2023-03-29 | End: 2023-03-29

## 2023-03-29 RX ADMIN — SODIUM CHLORIDE 1000 ML: 0.9 INJECTION, SOLUTION INTRAVENOUS at 04:17

## 2023-03-29 RX ADMIN — IOHEXOL 100 ML: 350 INJECTION, SOLUTION INTRAVENOUS at 06:02

## 2023-03-29 RX ADMIN — CEFTRIAXONE 1000 MG: 1 INJECTION, SOLUTION INTRAVENOUS at 05:33
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
## 2023-03-29 NOTE — TELEPHONE ENCOUNTER
Brandy Serrato is a 68 yo with recent TURBT on 3/15  Pt presented to [facility] ED due to gross hematuria  Labs stable, UA unremarkable  Patient able to void, bladder scan less than 60  CT showing bladder wall thickening in posterior urinary bladder near base of bladder  No clot burden  Urine appears yellow with slight pink tinge  No clots  Patient without abdominal pain  Pt discharged from ED  Please call to assess patients symptoms after ED visit  ED precautions if patient with inability to urinate, increased abdominal pain, suprapubic pressure  Thanks!

## 2023-03-29 NOTE — ED CARE HANDOFF
Emergency Department Sign Out Note        Sign out and transfer of care from Dr Mack Fitting  See Separate Emergency Department note  The patient, Kirk Gitelman , was evaluated by the previous provider for hematuria status post bladder tumor resection approximately 2 weeks ago       Workup Completed:  Patient has been seen and evaluated for above  Consultation via [facility] connect has been had with urology PA  Patient's urinalysis has improved from the first 1 obtained  We are currently pending a CT of the abdomen pelvis to assess for clot burden  ED Course / Workup Pending (followup): We will reassess pending CT scan  Will discuss further with urology after that result  Please see work-up section of note for further information regarding disposition  Disposition  Final diagnoses:   Hematuria, unspecified type     Time reflects when diagnosis was documented in both MDM as applicable and the Disposition within this note     Time User Action Codes Description Comment    3/29/2023  6:13 AM Pavithra Gamez Add [R31 9] Hematuria, unspecified type       ED Disposition     None      Follow-up Information    None       Patient's Medications   Discharge Prescriptions    No medications on file     No discharge procedures on file         ED Provider  Electronically Signed by     Skyla Mckeon DO  03/29/23 5301

## 2023-03-29 NOTE — TELEPHONE ENCOUNTER
"    Reason for Disposition  • Blood in urine  (Exception: could be normal menstrual bleeding)    Answer Assessment - Initial Assessment Questions  1  COLOR of URINE: \"Describe the color of the urine  \"  (e g , tea-colored, pink, red, blood clots, bloody)      Small blood clots and bright red urine     2  ONSET: \"When did the bleeding start? \"      Today     3  EPISODES: \"How many times has there been blood in the urine? \" or \"How many times today? \"     One episode     4  PAIN with URINATION: \"Is there any pain with passing your urine? \" If Yes, ask: \"How bad is the pain? \"  (Scale 1-10; or mild, moderate, severe)     - MILD - complains slightly about urination hurting     - MODERATE - interferes with normal activities       - SEVERE - excruciating, unwilling or unable to urinate because of the pain       Denies    5  FEVER: \"Do you have a fever? \" If Yes, ask: \"What is your temperature, how was it measured, and when did it start? \"      Denies    6  ASSOCIATED SYMPTOMS: Nicole Du you passing urine more frequently than usual?\"      Urgency     7  OTHER SYMPTOMS: \"Do you have any other symptoms? \" (e g , back/flank pain, abdominal pain, vomiting)  Denies    Taking ASA 81 mg    Protocols used: URINE - BLOOD IN-ADULT-      "
Pt seen in ER today 
Calm

## 2023-03-29 NOTE — DISCHARGE INSTRUCTIONS
Return to the emergency room or follow-up with urology with any immediate worsening  Otherwise, follow-up with urology as scheduled  Do not need to take the Keflex, cephalexin, that you have at home at this time  Thank you for choosing the emergency department at [facility]  We appreciated the opportunity and privilege to address your healthcare needs  We remain available to you should you require additional evaluation or assistance  We value your feedback and would appreciate the opportunity to address anything you identified as an opportunity to improve or where we excelled  If there are colleagues who deserve special recognition, please let us know! We hope you are feeling better soon! Please also note that sometimes there are subtle abnormalities in your lab values that you may observe when you access your record online  These are frequently not worrisome and if they are of concern we will have discussed them with you  However, we always encourage that you discuss any concerns you may have or observe on your record with your primary care provider  Please also be aware that voice transcription will occasionally recognize words or grammar differently than what was spoken

## 2023-03-30 ENCOUNTER — TELEPHONE (OUTPATIENT)
Dept: OTHER | Facility: OTHER | Age: 73
End: 2023-03-30

## 2023-03-30 LAB — BACTERIA UR CULT: NORMAL

## 2023-03-30 NOTE — TELEPHONE ENCOUNTER
Patient was seen at the ER yesterday  He is cancelling his cruise on 04-05-23, because of complications from the procedure on 03-15-23  He would like to speak with Dr Raghav Ayala when he is available  He will require a letter for the travel company explaining why he clinically cannot go on the cruise at this time, to receive a refund  Please contact Jm @ [phone] to discuss

## 2023-04-02 ENCOUNTER — NURSE TRIAGE (OUTPATIENT)
Dept: OTHER | Facility: OTHER | Age: 73
End: 2023-04-02

## 2023-04-02 NOTE — TELEPHONE ENCOUNTER
"  Reason for Disposition  • Blood in urine  (Exception: could be normal menstrual bleeding)    Answer Assessment - Initial Assessment Questions  1  COLOR of URINE: \"Describe the color of the urine  \"  (e g , tea-colored, pink, red, blood clots, bloody)      Blood clot passed this morning  2  ONSET: \"When did the bleeding start? \"       Since Tuesday on and off  Seen in ED for it  3  EPISODES: \"How many times has there been blood in the urine? \" or \"How many times today? \"      Once today with first morning void   4  PAIN with URINATION: \"Is there any pain with passing your urine? \" If Yes, ask: \"How bad is the pain? \"  (Scale 1-10; or mild, moderate, severe)     - MILD - complains slightly about urination hurting     - MODERATE - interferes with normal activities       - SEVERE - excruciating, unwilling or unable to urinate because of the pain       Denies  5  FEVER: \"Do you have a fever? \" If Yes, ask: \"What is your temperature, how was it measured, and when did it start? \"      Denies  6  ASSOCIATED SYMPTOMS: Tracy Bristle you passing urine more frequently than usual?\"      denies  7  OTHER SYMPTOMS: \"Do you have any other symptoms? \" (e g , back/flank pain, abdominal pain, vomiting)      Denies    Protocols used: URINE - BLOOD IN-ADULT-AH    "

## 2023-04-02 NOTE — TELEPHONE ENCOUNTER
Pt called in to report he passed another clot this morning with his first morning void  Per pt the clot was large  Pt denies any blood in his urine, pain or discomfort  Office has been aware of this ongoing concern  No other sx's noted  Pt aware the office will be in contact with him to schedule a follow up visit with his urologist  Pt encouraged to drink plenty of fluids to help with the clots   Pt agreed and will call back if he starts with blood in his urine and or fevers

## 2023-04-02 NOTE — TELEPHONE ENCOUNTER
"Regarding: Blood in Urine, Blood clot this morning  ----- Message from Cheo Celestin sent at 4/2/2023 11:49 AM EDT -----  \" I had a Procedure on 3- for Bladder Tumor, The Biopsy did not survive so I do not have results  I am having Blood in my urine and passed a Blood Clot yesterday and this morning   Is this normal \"    "

## 2023-04-03 ENCOUNTER — TELEPHONE (OUTPATIENT)
Dept: UROLOGY | Facility: CLINIC | Age: 73
End: 2023-04-03

## 2023-04-03 NOTE — LETTER
To Whom it may concern,    Mali Novak  was under the care of Dr Carolina Mcgovern for a procedure on March 15th, 2023 and was advised not to go on the cruise that he had scheduled due to symptoms that may arise due to the nature of the procedure  If you have any further question, please do not hesitate to contact our office at the above number    Thank You    Sincerely,  MD Benja Chavarria MA

## 2023-04-03 NOTE — TELEPHONE ENCOUNTER
Patient requested a letter because he cancelled his cruise this week, as advised by Dr Elsa Pettit will be mailed out to patient

## 2023-04-07 NOTE — TELEPHONE ENCOUNTER
Patient called stating he has not received his letter in the mail yet and was wondering if the office had a copy and could email it to the patient @ [email or web address]  Please call patient back to confirm

## 2023-06-28 ENCOUNTER — PROCEDURE VISIT (OUTPATIENT)
Dept: UROLOGY | Facility: CLINIC | Age: 73
End: 2023-06-28
Payer: COMMERCIAL

## 2023-06-28 VITALS
HEART RATE: 79 BPM | WEIGHT: 308 LBS | DIASTOLIC BLOOD PRESSURE: 86 MMHG | HEIGHT: 72 IN | SYSTOLIC BLOOD PRESSURE: 130 MMHG | TEMPERATURE: 97.1 F | OXYGEN SATURATION: 98 % | BODY MASS INDEX: 41.72 KG/M2

## 2023-06-28 DIAGNOSIS — R31.0 GROSS HEMATURIA: ICD-10-CM

## 2023-06-28 DIAGNOSIS — D49.4 BLADDER TUMOR: Primary | ICD-10-CM

## 2023-06-28 LAB
SL AMB  POCT GLUCOSE, UA: ABNORMAL
SL AMB LEUKOCYTE ESTERASE,UA: ABNORMAL
SL AMB POCT BILIRUBIN,UA: ABNORMAL
SL AMB POCT BLOOD,UA: ABNORMAL
SL AMB POCT CLARITY,UA: CLEAR
SL AMB POCT COLOR,UA: YELLOW
SL AMB POCT KETONES,UA: ABNORMAL
SL AMB POCT NITRITE,UA: ABNORMAL
SL AMB POCT PH,UA: ABNORMAL
SL AMB POCT SPECIFIC GRAVITY,UA: 1.02
SL AMB POCT URINE PROTEIN: ABNORMAL
SL AMB POCT UROBILINOGEN: 0.2

## 2023-06-28 PROCEDURE — 81003 URINALYSIS AUTO W/O SCOPE: CPT | Performed by: UROLOGY

## 2023-06-28 NOTE — PROGRESS NOTES
"   Cystoscopy     Date/Time 6/28/2023 10:00 AM     Performed by  Lashawn Samano MD   Authorized by Lashawn Samano MD     Universal Protocol:  Consent: Verbal consent obtained  Written consent obtained  Consent given by: patient  Time out: Immediately prior to procedure a \"time out\" was called to verify the correct patient, procedure, equipment, support staff and site/side marked as required  Timeout called at: 6/28/2023 10:44 AM   Patient identity confirmed: verbally with patient        Procedure Details:  Procedure type: cystoscopy    Patient tolerance: Patient tolerated the procedure well with no immediate complications    Additional Procedure Details: Patient had flexible digital cystoscopy performed today  He was prepped with Betadine lidocaine jelly  Cystoscopy revealed a normal urethra  An enlarged prostate gland  No median bar or median lobe  His bladder had a well-healed biopsy site in the left base of the bladder  No tumors noted  No erythema noted  Orifices were normal   No stones  Patient was able to visualize the findings  Patient will take an antibiotic Ceftin that he has at home        "
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
## 2023-06-29 LAB
SL AMB  POCT GLUCOSE, UA: ABNORMAL
SL AMB LEUKOCYTE ESTERASE,UA: ABNORMAL
SL AMB POCT BILIRUBIN,UA: ABNORMAL
SL AMB POCT BLOOD,UA: ABNORMAL
SL AMB POCT CLARITY,UA: CLEAR
SL AMB POCT COLOR,UA: YELLOW
SL AMB POCT KETONES,UA: ABNORMAL
SL AMB POCT NITRITE,UA: ABNORMAL
SL AMB POCT PH,UA: 5.5
SL AMB POCT SPECIFIC GRAVITY,UA: 1.02
SL AMB POCT URINE PROTEIN: ABNORMAL
SL AMB POCT UROBILINOGEN: 0.2

## 2023-11-06 ENCOUNTER — HOSPITAL ENCOUNTER (OUTPATIENT)
Dept: ULTRASOUND IMAGING | Facility: HOSPITAL | Age: 73
Discharge: HOME | End: 2023-11-06
Payer: COMMERCIAL

## 2023-11-06 ENCOUNTER — HOSPITAL ENCOUNTER (OUTPATIENT)
Dept: NON INVASIVE DIAGNOSTICS | Facility: HOSPITAL | Age: 73
Discharge: HOME | End: 2023-11-06
Payer: COMMERCIAL

## 2023-11-06 DIAGNOSIS — N28.1 CYST OF KIDNEY, ACQUIRED: ICD-10-CM

## 2023-11-06 DIAGNOSIS — I65.23 CAROTID STENOSIS, NON-SYMPTOMATIC, BILATERAL: ICD-10-CM

## 2023-11-06 PROCEDURE — 93880 EXTRACRANIAL BILAT STUDY: CPT

## 2023-11-06 PROCEDURE — 93880 EXTRACRANIAL BILAT STUDY: CPT | Performed by: SURGERY

## 2023-11-06 PROCEDURE — 76775 US EXAM ABDO BACK WALL LIM: CPT

## 2024-02-29 ENCOUNTER — HOSPITAL ENCOUNTER (OUTPATIENT)
Dept: CT IMAGING | Facility: HOSPITAL | Age: 74
End: 2024-02-29
Payer: COMMERCIAL

## 2024-02-29 DIAGNOSIS — R91.8 LUNG MASS: ICD-10-CM

## 2024-02-29 PROCEDURE — 71250 CT THORAX DX C-: CPT

## 2024-02-29 PROCEDURE — G1004 CDSM NDSC: HCPCS

## 2024-07-25 ENCOUNTER — HOSPITAL ENCOUNTER (OUTPATIENT)
Dept: NON INVASIVE DIAGNOSTICS | Facility: HOSPITAL | Age: 74
Discharge: HOME | End: 2024-07-25
Payer: COMMERCIAL

## 2024-07-25 DIAGNOSIS — R22.42 LOCALIZED SWELLING, MASS AND LUMP, LEFT LOWER LIMB: ICD-10-CM

## 2024-07-25 PROCEDURE — 93971 EXTREMITY STUDY: CPT

## 2024-07-25 PROCEDURE — 93971 EXTREMITY STUDY: CPT | Performed by: SURGERY

## 2024-10-06 ENCOUNTER — APPOINTMENT (EMERGENCY)
Dept: CT IMAGING | Facility: HOSPITAL | Age: 74
End: 2024-10-06
Payer: COMMERCIAL

## 2024-10-06 ENCOUNTER — HOSPITAL ENCOUNTER (EMERGENCY)
Facility: HOSPITAL | Age: 74
Discharge: HOME | End: 2024-10-06
Attending: EMERGENCY MEDICINE
Payer: COMMERCIAL

## 2024-10-06 VITALS
HEART RATE: 89 BPM | OXYGEN SATURATION: 97 % | WEIGHT: 315 LBS | HEIGHT: 72 IN | TEMPERATURE: 97.8 F | SYSTOLIC BLOOD PRESSURE: 141 MMHG | BODY MASS INDEX: 42.66 KG/M2 | RESPIRATION RATE: 18 BRPM | DIASTOLIC BLOOD PRESSURE: 74 MMHG

## 2024-10-06 DIAGNOSIS — F41.9 ANXIETY: ICD-10-CM

## 2024-10-06 DIAGNOSIS — R25.1 TREMOR: Primary | ICD-10-CM

## 2024-10-06 PROCEDURE — 70496 CT ANGIOGRAPHY HEAD: CPT

## 2024-10-06 PROCEDURE — 99285 EMERGENCY DEPT VISIT HI MDM: CPT | Performed by: EMERGENCY MEDICINE

## 2024-10-06 PROCEDURE — 70498 CT ANGIOGRAPHY NECK: CPT

## 2024-10-06 PROCEDURE — 99284 EMERGENCY DEPT VISIT MOD MDM: CPT

## 2024-10-06 RX ADMIN — IOHEXOL 100 ML: 350 INJECTION, SOLUTION INTRAVENOUS at 18:32

## 2024-10-06 NOTE — DISCHARGE INSTRUCTIONS
There were no acute findings on the CAT scan performed this evening.  Please follow-up with your primary care physician as needed.  Hope you enjoy your upcoming cruise.    Thank you for choosing the emergency department at [facility]. We appreciated the opportunity and privilege to address your healthcare needs. We remain available to you should you require additional evaluation or assistance. We value your feedback and would appreciate the opportunity to address anything you identified as an opportunity to improve or where we excelled. If there are colleagues who deserve special recognition, please let us know! We hope you are feeling better soon!    Please also note that sometimes there are subtle abnormalities in your lab values that you may observe when you access your record online.  These are frequently not worrisome and if they are of concern we will have discussed them with you.  However, we always encourage that you discuss any concerns you may have or observe on your record with your primary care provider.   Please also note that while your visit documentation was reviewed prior to completion, voice transcription will occasionally recognize words or grammar differently than what was spoken.
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
## 2025-02-27 ENCOUNTER — HOSPITAL ENCOUNTER (OUTPATIENT)
Dept: CT IMAGING | Facility: HOSPITAL | Age: 75
End: 2025-02-27
Payer: COMMERCIAL

## 2025-02-27 DIAGNOSIS — R91.1 PULMONARY NODULE: ICD-10-CM

## 2025-02-27 DIAGNOSIS — F17.290 CIGAR SMOKER: ICD-10-CM

## 2025-02-27 PROCEDURE — 71250 CT THORAX DX C-: CPT

## 2025-04-17 ENCOUNTER — HOSPITAL ENCOUNTER (OUTPATIENT)
Dept: ULTRASOUND IMAGING | Facility: HOSPITAL | Age: 75
End: 2025-04-17
Attending: INTERNAL MEDICINE
Payer: COMMERCIAL

## 2025-04-17 DIAGNOSIS — N28.1 CYST OF KIDNEY, ACQUIRED: ICD-10-CM

## 2025-04-17 PROCEDURE — 76775 US EXAM ABDO BACK WALL LIM: CPT

## (undated) DEVICE — TELFA NON-ADHERENT ABSORBENT DRESSING: Brand: TELFA

## (undated) DEVICE — GAUZE SPONGES,16 PLY: Brand: CURITY

## (undated) DEVICE — STERILE SURGICAL LUBRICANT,  TUBE: Brand: SURGILUBE

## (undated) DEVICE — SCD SEQUENTIAL COMPRESSION COMFORT SLEEVE MEDIUM KNEE LENGTH: Brand: KENDALL SCD

## (undated) DEVICE — 4-PORT MANIFOLD: Brand: NEPTUNE 2

## (undated) DEVICE — GLOVE SRG BIOGEL 6.5

## (undated) DEVICE — MAT FLOOR STEP DRI 24 X 36 IN N-STRL

## (undated) DEVICE — PACK TUR

## (undated) DEVICE — DISPOSABLE OR TOWEL: Brand: CARDINAL HEALTH

## (undated) DEVICE — TUBING SUCTION 5MM X 12 FT

## (undated) DEVICE — NEEDLE 18 G X 1 1/2

## (undated) DEVICE — PAD GROUNDING ADULT

## (undated) DEVICE — CATH URETERAL 5FR X 70 CM FLEX TIP POLYUR BARD

## (undated) DEVICE — CHLORHEXIDINE 4PCT 4 OZ

## (undated) DEVICE — SINGLE PORT MANIFOLD: Brand: NEPTUNE 2